# Patient Record
Sex: FEMALE | Race: WHITE | Employment: FULL TIME
[De-identification: names, ages, dates, MRNs, and addresses within clinical notes are randomized per-mention and may not be internally consistent; named-entity substitution may affect disease eponyms.]

---

## 2021-06-14 ENCOUNTER — NURSE TRIAGE (OUTPATIENT)
Dept: OTHER | Facility: CLINIC | Age: 51
End: 2021-06-14

## 2021-06-14 NOTE — TELEPHONE ENCOUNTER
any other symptoms? \" (e.g., fever, chest pain, vomiting, diarrhea, bleeding)        Bad heart burn more than normal over the past 2 days    11. PREGNANCY: \"Is there any chance you are pregnant? \" \"When was your last menstrual period? \"        No    Protocols used: [id number]

## 2021-06-30 ENCOUNTER — INITIAL CONSULT (OUTPATIENT)
Dept: PAIN MANAGEMENT | Age: 51
End: 2021-06-30
Payer: COMMERCIAL

## 2021-06-30 VITALS
BODY MASS INDEX: 35.33 KG/M2 | SYSTOLIC BLOOD PRESSURE: 122 MMHG | DIASTOLIC BLOOD PRESSURE: 90 MMHG | HEIGHT: 62 IN | WEIGHT: 192 LBS | TEMPERATURE: 97.2 F

## 2021-06-30 DIAGNOSIS — M51.36 DDD (DEGENERATIVE DISC DISEASE), LUMBAR: ICD-10-CM

## 2021-06-30 DIAGNOSIS — M47.817 LUMBOSACRAL SPONDYLOSIS WITHOUT MYELOPATHY: Primary | ICD-10-CM

## 2021-06-30 PROCEDURE — 99243 OFF/OP CNSLTJ NEW/EST LOW 30: CPT | Performed by: ANESTHESIOLOGY

## 2021-06-30 RX ORDER — TRAMADOL HYDROCHLORIDE 50 MG/1
50 TABLET ORAL 2 TIMES DAILY PRN
Qty: 60 TABLET | Refills: 0 | Status: SHIPPED | OUTPATIENT
Start: 2021-06-30 | End: 2021-07-30

## 2021-06-30 ASSESSMENT — ENCOUNTER SYMPTOMS
NAUSEA: 0
SHORTNESS OF BREATH: 0
CONSTIPATION: 0
BACK PAIN: 1
DIARRHEA: 0

## 2021-06-30 NOTE — PROGRESS NOTES
Patient:  Micky Madera  YOB: 1970  Date: 6/30/2021      Subjective:     Christina Bull is a 46 y.o. female who presents today with:    Chief Complaint   Patient presents with    Back Pain       HPI: The patient presents to the clinic for a consultation evaluation. The patient started having low back pain about 20 years ago while she was carrying a baby and during the postpartum she is in good improvement and this pain had been on and off with intermittent flareups up until recently when current flareup started about 4 weeks ago. She tried physical therapy in the past without benefit in March and she also has tried various kind of nonsteroidal anti-inflammatory agents and failed. Apparently she had some imaging studies but we do not have the reports. Allergies:  Nsaids, Clindamycin, and Codeine  No past medical history on file. No past surgical history on file. Social History     Socioeconomic History    Marital status:      Spouse name: Not on file    Number of children: Not on file    Years of education: Not on file    Highest education level: Not on file   Occupational History    Not on file   Tobacco Use    Smoking status: Current Every Day Smoker     Packs/day: 0.25     Years: 30.00     Pack years: 7.50     Types: Cigarettes    Smokeless tobacco: Never Used   Substance and Sexual Activity    Alcohol use: Yes     Comment: drinks socially    Drug use: Not on file    Sexual activity: Not on file   Other Topics Concern    Not on file   Social History Narrative    Not on file     Social Determinants of Health     Financial Resource Strain:     Difficulty of Paying Living Expenses:    Food Insecurity:     Worried About Running Out of Food in the Last Year:     Ran Out of Food in the Last Year:    Transportation Needs:     Lack of Transportation (Medical):      Lack of Transportation (Non-Medical):    Physical Activity:     Days of Exercise per Week:     Minutes of Exercise per Session:    Stress:     Feeling of Stress :    Social Connections:     Frequency of Communication with Friends and Family:     Frequency of Social Gatherings with Friends and Family:     Attends Baptism Services:     Active Member of Clubs or Organizations:     Attends Club or Organization Meetings:     Marital Status:    Intimate Partner Violence:     Fear of Current or Ex-Partner:     Emotionally Abused:     Physically Abused:     Sexually Abused:      No family history on file. No current outpatient medications on file prior to visit. No current facility-administered medications on file prior to visit. She previously tried physical therapy on or about In the past.        Recent Procedures:  N/A    Review of Systems   Constitutional: Negative for fever. HENT: Negative for hearing loss. Respiratory: Negative for shortness of breath. Gastrointestinal: Negative for constipation, diarrhea and nausea. Genitourinary: Negative for difficulty urinating. Musculoskeletal: Positive for back pain. Negative for neck pain. Skin: Negative for rash. Neurological: Negative for headaches. Hematological: Does not bruise/bleed easily. Psychiatric/Behavioral: Negative for sleep disturbance. Objective:     Vitals:  BP (!) 122/90 (Site: Left Upper Arm, Position: Sitting, Cuff Size: Large Adult)   Temp 97.2 °F (36.2 °C)   Ht 5' 2\" (1.575 m)   Wt 192 lb (87.1 kg)   BMI 35.12 kg/m² Pain Score:   7    PHYSICAL EXAM:    Patient alert and oriented times three, recent and remote memory intact, mood and affect normal, judgement and insight normal. Strength functional for ambulation. Balance and coordinational functional. Visualized skin intact. No visualized cyanosis, pulses intact. Cranial nerves 2-12 grossly intact. No obvious upper motor neuron signs seen. Sensation intact to light touch.     On physical examination heel and toe walking is adequate but the range of motion of the lumbosacral spine is limited to our perspective with a positive facet joint provocative maneuver. Straight leg raising is negative bilaterally. She has axial pain with the low back tenderness greater on the right side. Radiculopathy:  Negative    Studies Review:  Reviewed None. Assessment:           Diagnosis Orders   1. Lumbosacral spondylosis without myelopathy  XR LUMBAR SPINE (MIN 4 VIEWS)    OR INJ DX/THER AGNT PARAVERT FACET JOINT, LUMBAR/SAC, 1ST LEVEL    OR INJ DX/THER AGNT PARAVERT FACET JOINT, LUMBAR/SAC, 2ND LEVEL   2. DDD (degenerative disc disease), lumbar  XR LUMBAR SPINE (MIN 4 VIEWS)    OR INJ DX/THER AGNT PARAVERT FACET JOINT, LUMBAR/SAC, 1ST LEVEL    OR INJ DX/THER AGNT PARAVERT FACET JOINT, LUMBAR/SAC, 2ND LEVEL         Plan:     Orders Placed This Encounter   Procedures    XR LUMBAR SPINE (MIN 4 VIEWS)     Standing Status:   Future     Standing Expiration Date:   9/28/2021     Scheduling Instructions:      XR LUMBAR AP LAT FLEX EXT     Order Specific Question:   Reason for exam:     Answer:   pain    OR INJ DX/THER AGNT PARAVERT FACET JOINT, LUMBAR/SAC, 1ST LEVEL     Ghulam L3,4,5  MBB     Standing Status:   Future     Standing Expiration Date:   9/28/2021    OR INJ DX/THER AGNT PARAVERT FACET JOINT, LUMBAR/SAC, 2ND LEVEL     Ghulam L3,4,5  MBB     Standing Status:   Future     Standing Expiration Date:   9/28/2021       No orders of the defined types were placed in this encounter. Clinically we are dealing with the lower lumbar facet arthrosis. Discussed with the patient about facet denervation and MBB's as a test injections. The patient understands and wishes to proceed. She will be placed on tramadol 50 mg tablet twice a day. Follow up:  Return for 1-2 weeks MBB.     Sandip Suárez MD

## 2021-07-01 ENCOUNTER — TELEPHONE (OUTPATIENT)
Dept: PAIN MANAGEMENT | Age: 51
End: 2021-07-01

## 2021-07-01 NOTE — TELEPHONE ENCOUNTER
ORDER PLACED:    Date: 06/30/21  Description: Jaquan GOLDMAN  Order Number: [id number]  Ordering Provider: [facility]  Performing Provider: [facility]  CPT Codes: 43242  ICD10 Codes: K34.616

## 2021-09-02 NOTE — TELEPHONE ENCOUNTER
INS DENIED AUTH FOR JACKELIN MBB    REASON:  RECORDS SHOW A REQUEST FOR AN INJECTION INTOTHE SPINE THAT WILL INCLUDE STEROIDS. WHEN STEROIDS ARE USED, THIS PROCEDURE IS EXPERIMENTAL AND INVESTIGASTIONAL. THIS MEANS THAT IT IS NOT SUPPORTED BECAUSE RESEARCH HAS NOT PROVEN IT TO BE HELPFUL.       CHECKING TO APPEAL

## 2021-09-15 NOTE — TELEPHONE ENCOUNTER
EMILY GONZALEZ @ [phone] IN REGARDING MBB DENIAL DTD 7.6.21, LUISK TO TIM. I EXPLAINED WE REQUESTED AN MBB AND THE DENIAL IS FOR A FACET. SHE STATES THE DENIAL IS PAST TIMELY AND THE ONLY OPTION WE HAVE IS TO START A NEW [address]. EMAIL TO  TO START AUTH.         PASTOR#[id number] TIME: 2:00PM

## 2021-09-18 NOTE — TELEPHONE ENCOUNTER
CC- MBB AUTH- BILAT L3,4,5 X1- 9/18/2021 TO 12/31/2021     OK to schedule procedure approved as above. Please note sides/levels approved and date range.    (If applicable, sides/levels approved may differ from those ordered)    TO [facility]

## 2021-09-20 NOTE — TELEPHONE ENCOUNTER
Patient returned call stating that she does not want to schedule procedure at this time or anytime in the near future. ...

## 2023-01-12 NOTE — TELEPHONE ENCOUNTER
AUTHORIZATION: JACKELIN L3,4,5 MBB     INSURANCE: ALETHA JOSE [facility] VIA: Stephen Alston #: [id number]     DATE RANGE: 9/18/2021 TO 12/31/2021     TELEPHONE CALL ROUTED TO MA TO SCHEDULE. Detail Level: Detailed

## 2023-03-09 LAB — URINE CULTURE: NORMAL

## 2023-03-19 LAB — URINE CULTURE: NO GROWTH

## 2023-03-21 LAB
APPEARANCE, URINE: ABNORMAL
BACTERIA, URINE: ABNORMAL /HPF
BILIRUBIN, URINE: NEGATIVE
BLOOD, URINE: ABNORMAL
COLOR, URINE: YELLOW
GLUCOSE, URINE: NEGATIVE MG/DL
KETONES, URINE: NEGATIVE MG/DL
LEUKOCYTE ESTERASE, URINE: ABNORMAL
MUCUS, URINE: ABNORMAL /LPF
NITRITE, URINE: NEGATIVE
PH, URINE: 7 (ref 5–8)
PROTEIN, URINE: NEGATIVE MG/DL
RBC, URINE: 33 /HPF (ref 0–5)
SPECIFIC GRAVITY, URINE: 1.01 (ref 1–1.03)
SQUAMOUS EPITHELIAL CELLS, URINE: 5 /HPF
UROBILINOGEN, URINE: <2 MG/DL (ref 0–1.9)
WBC, URINE: 16 /HPF (ref 0–5)

## 2023-03-22 LAB — URINE CULTURE: NORMAL

## 2023-11-26 ENCOUNTER — APPOINTMENT (OUTPATIENT)
Dept: RADIOLOGY | Facility: HOSPITAL | Age: 53
End: 2023-11-26
Payer: COMMERCIAL

## 2023-11-26 ENCOUNTER — HOSPITAL ENCOUNTER (EMERGENCY)
Dept: CARDIOLOGY | Facility: HOSPITAL | Age: 53
Discharge: HOME | End: 2023-11-26
Payer: COMMERCIAL

## 2023-11-26 ENCOUNTER — HOSPITAL ENCOUNTER (EMERGENCY)
Facility: HOSPITAL | Age: 53
Discharge: HOME | End: 2023-11-26
Payer: COMMERCIAL

## 2023-11-26 VITALS
SYSTOLIC BLOOD PRESSURE: 150 MMHG | DIASTOLIC BLOOD PRESSURE: 88 MMHG | HEIGHT: 62 IN | BODY MASS INDEX: 30.36 KG/M2 | OXYGEN SATURATION: 96 % | TEMPERATURE: 98.2 F | WEIGHT: 165 LBS | HEART RATE: 67 BPM | RESPIRATION RATE: 18 BRPM

## 2023-11-26 DIAGNOSIS — J01.90 ACUTE SINUSITIS, RECURRENCE NOT SPECIFIED, UNSPECIFIED LOCATION: ICD-10-CM

## 2023-11-26 DIAGNOSIS — R03.0 ELEVATED BLOOD PRESSURE READING: Primary | ICD-10-CM

## 2023-11-26 LAB
ALBUMIN SERPL BCP-MCNC: 4.6 G/DL (ref 3.4–5)
ALP SERPL-CCNC: 40 U/L (ref 33–110)
ALT SERPL W P-5'-P-CCNC: 20 U/L (ref 7–45)
ANION GAP SERPL CALC-SCNC: 16 MMOL/L (ref 10–20)
AST SERPL W P-5'-P-CCNC: 29 U/L (ref 9–39)
BASOPHILS # BLD AUTO: 0.07 X10*3/UL (ref 0–0.1)
BASOPHILS NFR BLD AUTO: 1.1 %
BILIRUB SERPL-MCNC: 0.8 MG/DL (ref 0–1.2)
BNP SERPL-MCNC: 25 PG/ML (ref 0–99)
BUN SERPL-MCNC: 16 MG/DL (ref 6–23)
CALCIUM SERPL-MCNC: 9.3 MG/DL (ref 8.6–10.3)
CARDIAC TROPONIN I PNL SERPL HS: 3 NG/L (ref 0–13)
CHLORIDE SERPL-SCNC: 102 MMOL/L (ref 98–107)
CO2 SERPL-SCNC: 25 MMOL/L (ref 21–32)
CREAT SERPL-MCNC: 0.84 MG/DL (ref 0.5–1.05)
EOSINOPHIL # BLD AUTO: 0.22 X10*3/UL (ref 0–0.7)
EOSINOPHIL NFR BLD AUTO: 3.6 %
ERYTHROCYTE [DISTWIDTH] IN BLOOD BY AUTOMATED COUNT: 12.6 % (ref 11.5–14.5)
FLUAV RNA RESP QL NAA+PROBE: NOT DETECTED
FLUBV RNA RESP QL NAA+PROBE: NOT DETECTED
GFR SERPL CREATININE-BSD FRML MDRD: 82 ML/MIN/1.73M*2
GLUCOSE SERPL-MCNC: 93 MG/DL (ref 74–99)
HCT VFR BLD AUTO: 42.6 % (ref 36–46)
HGB BLD-MCNC: 14.6 G/DL (ref 12–16)
IMM GRANULOCYTES # BLD AUTO: 0.03 X10*3/UL (ref 0–0.7)
IMM GRANULOCYTES NFR BLD AUTO: 0.5 % (ref 0–0.9)
LYMPHOCYTES # BLD AUTO: 2 X10*3/UL (ref 1.2–4.8)
LYMPHOCYTES NFR BLD AUTO: 32.5 %
MAGNESIUM SERPL-MCNC: 2.12 MG/DL (ref 1.6–2.4)
MCH RBC QN AUTO: 32.8 PG (ref 26–34)
MCHC RBC AUTO-ENTMCNC: 34.3 G/DL (ref 32–36)
MCV RBC AUTO: 96 FL (ref 80–100)
MONOCYTES # BLD AUTO: 0.46 X10*3/UL (ref 0.1–1)
MONOCYTES NFR BLD AUTO: 7.5 %
NEUTROPHILS # BLD AUTO: 3.38 X10*3/UL (ref 1.2–7.7)
NEUTROPHILS NFR BLD AUTO: 54.8 %
NRBC BLD-RTO: 0 /100 WBCS (ref 0–0)
PLATELET # BLD AUTO: 269 X10*3/UL (ref 150–450)
POTASSIUM SERPL-SCNC: 5.1 MMOL/L (ref 3.5–5.3)
PROT SERPL-MCNC: 7.5 G/DL (ref 6.4–8.2)
RBC # BLD AUTO: 4.45 X10*6/UL (ref 4–5.2)
SARS-COV-2 RNA RESP QL NAA+PROBE: NOT DETECTED
SODIUM SERPL-SCNC: 138 MMOL/L (ref 136–145)
WBC # BLD AUTO: 6.2 X10*3/UL (ref 4.4–11.3)

## 2023-11-26 PROCEDURE — 71045 X-RAY EXAM CHEST 1 VIEW: CPT

## 2023-11-26 PROCEDURE — 93005 ELECTROCARDIOGRAM TRACING: CPT

## 2023-11-26 PROCEDURE — 87636 SARSCOV2 & INF A&B AMP PRB: CPT | Performed by: NURSE PRACTITIONER

## 2023-11-26 PROCEDURE — 80053 COMPREHEN METABOLIC PANEL: CPT | Performed by: NURSE PRACTITIONER

## 2023-11-26 PROCEDURE — 71045 X-RAY EXAM CHEST 1 VIEW: CPT | Performed by: RADIOLOGY

## 2023-11-26 PROCEDURE — 85025 COMPLETE CBC W/AUTO DIFF WBC: CPT | Performed by: NURSE PRACTITIONER

## 2023-11-26 PROCEDURE — 99284 EMERGENCY DEPT VISIT MOD MDM: CPT

## 2023-11-26 PROCEDURE — 83880 ASSAY OF NATRIURETIC PEPTIDE: CPT | Performed by: NURSE PRACTITIONER

## 2023-11-26 PROCEDURE — 83735 ASSAY OF MAGNESIUM: CPT | Performed by: NURSE PRACTITIONER

## 2023-11-26 PROCEDURE — 99283 EMERGENCY DEPT VISIT LOW MDM: CPT | Mod: 25

## 2023-11-26 PROCEDURE — 36415 COLL VENOUS BLD VENIPUNCTURE: CPT | Performed by: NURSE PRACTITIONER

## 2023-11-26 PROCEDURE — 84484 ASSAY OF TROPONIN QUANT: CPT | Performed by: NURSE PRACTITIONER

## 2023-11-26 RX ORDER — AMOXICILLIN AND CLAVULANATE POTASSIUM 875; 125 MG/1; MG/1
875 TABLET, FILM COATED ORAL 2 TIMES DAILY
Qty: 14 TABLET | Refills: 0 | Status: SHIPPED | OUTPATIENT
Start: 2023-11-26 | End: 2023-12-03

## 2023-11-26 ASSESSMENT — PAIN DESCRIPTION - LOCATION: LOCATION: THROAT

## 2023-11-26 ASSESSMENT — LIFESTYLE VARIABLES
EVER HAD A DRINK FIRST THING IN THE MORNING TO STEADY YOUR NERVES TO GET RID OF A HANGOVER: NO
REASON UNABLE TO ASSESS: NO
EVER FELT BAD OR GUILTY ABOUT YOUR DRINKING: NO
HAVE PEOPLE ANNOYED YOU BY CRITICIZING YOUR DRINKING: NO
HAVE YOU EVER FELT YOU SHOULD CUT DOWN ON YOUR DRINKING: NO

## 2023-11-26 ASSESSMENT — COLUMBIA-SUICIDE SEVERITY RATING SCALE - C-SSRS
2. HAVE YOU ACTUALLY HAD ANY THOUGHTS OF KILLING YOURSELF?: NO
1. IN THE PAST MONTH, HAVE YOU WISHED YOU WERE DEAD OR WISHED YOU COULD GO TO SLEEP AND NOT WAKE UP?: NO
6. HAVE YOU EVER DONE ANYTHING, STARTED TO DO ANYTHING, OR PREPARED TO DO ANYTHING TO END YOUR LIFE?: NO

## 2023-11-26 ASSESSMENT — PAIN - FUNCTIONAL ASSESSMENT: PAIN_FUNCTIONAL_ASSESSMENT: 0-10

## 2023-11-26 ASSESSMENT — PAIN DESCRIPTION - ORIENTATION: ORIENTATION: MID

## 2023-11-26 ASSESSMENT — PAIN SCALES - GENERAL: PAINLEVEL_OUTOF10: 3

## 2023-11-26 ASSESSMENT — PAIN DESCRIPTION - FREQUENCY: FREQUENCY: CONSTANT/CONTINUOUS

## 2023-11-26 ASSESSMENT — PAIN DESCRIPTION - DESCRIPTORS: DESCRIPTORS: ACHING;SORE

## 2023-11-26 NOTE — ED PROVIDER NOTES
HPI   Chief Complaint   Patient presents with    Flu Symptoms     Sore throat, chills, congestion, mucous since Tuesday,        53-year-old female presents emergency department, patient states since Tuesday she is been feeling unwell, describes facial pressure, congestion, cough, body aches and chills.  States this morning her temperature was elevated to 99.3 so she decided to go to the urgent care to get checked out.  At urgent care they notified her that her blood pressure was significantly elevated.  States she does have history of hypertension but usually related to her kidney stones, states all summer her blood pressure was normal at all of her doctors appointments.      History provided by:  Patient   used: No                        No data recorded                Patient History   Past Medical History:   Diagnosis Date    Acute vaginitis 08/18/2015    Bacterial vaginosis    Change in bowel habit 07/06/2018    Change in bowel habits    Contusion of unspecified back wall of thorax, initial encounter 04/20/2015    Contusion, back    Contusion of unspecified shoulder, initial encounter 04/20/2015    Shoulder contusion    Contusion of unspecified upper arm, initial encounter 04/20/2015    Contusion of arm    Disorder of teeth and supporting structures, unspecified     Chronic dental pain    Enterocolitis due to Clostridium difficile, not specified as recurrent     Clostridium difficile diarrhea    Essential (primary) hypertension 11/10/2015    Elevated BP    Personal history of other (healed) physical injury and trauma 04/20/2015    History of sprain of knee    Personal history of other diseases of the circulatory system     H/O: HTN (hypertension)    Personal history of other diseases of the respiratory system 09/25/2015    History of acute bronchitis    Personal history of other specified conditions 01/11/2019    History of diarrhea    Personal history of urinary calculi     History of renal  calculi    Pleurodynia 11/16/2015    Rib pain on right side    Unspecified fall, initial encounter 07/26/2016    Fall, initial encounter     Past Surgical History:   Procedure Laterality Date    APPENDECTOMY  04/15/2015    Appendectomy    LITHOTRIPSY  05/02/2016    Renal Lithotripsy    OTHER SURGICAL HISTORY  04/15/2015    Closed Treatment Of Acromioclavicular Dislocation    OTHER SURGICAL HISTORY  02/08/2022    Cystoscopy With Insertion Of Ureteral Stent    OTHER SURGICAL HISTORY  05/02/2016    Abdominal Paracentesis (Therapeutic)     No family history on file.  Social History     Tobacco Use    Smoking status: Not on file    Smokeless tobacco: Not on file   Substance Use Topics    Alcohol use: Not on file    Drug use: Not on file       Physical Exam   ED Triage Vitals [11/26/23 1236]   Temp Heart Rate Resp BP   36.8 °C (98.2 °F) 74 17 (!) 198/128      SpO2 Temp Source Heart Rate Source Patient Position   97 % Temporal Monitor Sitting      BP Location FiO2 (%)     Right arm --       Physical Exam  Physical Exam:  Constitutional: Vitals noted, no distress. Afebrile.   EENT: TMs clear. Posterior oropharynx unremarkable.   Cardiovascular: Regular, rate, rhythm, no murmur.   Pulmonary: Lungs with moderately diminished, some coarse breath sounds.  Gastrointestinal: Soft, nonsurgical. Nontender. No peritoneal signs. Normoactive bowel sounds.   Musculoskeletal: No peripheral edema. Negative Homans bilaterally, no cords.   Skin: No rash.       ED Course & MDM   Diagnoses as of 11/26/23 1437   Elevated blood pressure reading   Acute sinusitis, recurrence not specified, unspecified location     Labs Reviewed   COMPREHENSIVE METABOLIC PANEL - Normal       Result Value    Glucose 93      Sodium 138      Potassium 5.1      Chloride 102      Bicarbonate 25      Anion Gap 16      Urea Nitrogen 16      Creatinine 0.84      eGFR 82      Calcium 9.3      Albumin 4.6      Alkaline Phosphatase 40      Total Protein 7.5      AST 29       Bilirubin, Total 0.8      ALT 20     MAGNESIUM - Normal    Magnesium 2.12     TROPONIN I, HIGH SENSITIVITY - Normal    Troponin I, High Sensitivity 3      Narrative:     Less than 99th percentile of normal range cutoff-  Female and children under 18 years old <14 ng/L; Male <21 ng/L: Negative  Repeat testing should be performed if clinically indicated.     Female and children under 18 years old 14-50 ng/L; Male 21-50 ng/L:  Consistent with possible cardiac damage and possible increased clinical   risk. Serial measurements may help to assess extent of myocardial damage.     >50 ng/L: Consistent with cardiac damage, increased clinical risk and  myocardial infarction. Serial measurements may help assess extent of   myocardial damage.      NOTE: Children less than 1 year old may have higher baseline troponin   levels and results should be interpreted in conjunction with the overall   clinical context.     NOTE: Troponin I testing is performed using a different   testing methodology at [facility] than at other   [facility]. Direct result comparisons should only   be made within the same method.   B-TYPE NATRIURETIC PEPTIDE - Normal    BNP 25      Narrative:        <100 pg/mL - Heart failure unlikely  100-299 pg/mL - Intermediate probability of acute heart                  failure exacerbation. Correlate with clinical                  context and patient history.    >=300 pg/mL - Heart Failure likely. Correlate with clinical                  context and patient history.    BNP testing is performed using different testing methodology at [facility] than at other [facility]. Direct result comparisons should only be made within the same method.      SARS-COV-2 AND INFLUENZA A/B PCR - Normal    Flu A Result Not Detected      Flu B Result Not Detected      Coronavirus 2019, PCR Not Detected      Narrative:     This assay has received FDA Emergency Use Authorization (EUA) and  is only  authorized for the duration of time that circumstances exist to justify the authorization of the emergency use of in vitro diagnostic tests for the detection of SARS-CoV-2 virus and/or diagnosis of COVID-19 infection under section 564(b)(1) of the Act, 21 U.S.C. 360bbb-3(b)(1). Testing for SARS-CoV-2 is only recommended for patients who meet current clinical and/or epidemiological criteria as defined by federal, state, or local public health directives. This assay is an in vitro diagnostic nucleic acid amplification test for the qualitative detection of SARS-CoV-2, Influenza A, and Influenza B from nasopharyngeal specimens and has been validated for use at [facility]. Negative results do not preclude COVID-19 infections or Influenza A/B infections, and should not be used as the sole basis for diagnosis, treatment, or other management decisions. If Influenza A/B and RSV PCR results are negative, testing for Parainfluenza virus, Adenovirus and Metapneumovirus is routinely performed for [facility] pediatric oncology and intensive care inpatients, and is available on other patients by placing an add-on request.    CBC WITH AUTO DIFFERENTIAL    WBC 6.2      nRBC 0.0      RBC 4.45      Hemoglobin 14.6      Hematocrit 42.6      MCV 96      MCH 32.8      MCHC 34.3      RDW 12.6      Platelets 269      Neutrophils % 54.8      Immature Granulocytes %, Automated 0.5      Lymphocytes % 32.5      Monocytes % 7.5      Eosinophils % 3.6      Basophils % 1.1      Neutrophils Absolute 3.38      Immature Granulocytes Absolute, Automated 0.03      Lymphocytes Absolute 2.00      Monocytes Absolute 0.46      Eosinophils Absolute 0.22      Basophils Absolute 0.07          XR chest 1 view   Final Result   NO ACUTE DISEASE IN THE CHEST        MACRO:   None        Signed by: Maury Sumner 11/26/2023 2:25 PM   Dictation workstation:   [id number]         EKG at 1314 with ventricular rate of 70, as interpreted me, shows normal  sinus rhythm with normal axis normal interval.  Unremarkable ST-T wave pattern, no evidence of acute ischemia or other acute finding.    Medical Decision Making  Laboratory work-up was obtained, patient CBC and metabolic panels were unremarkable, negative troponin, BNP.  Chest x-ray unremarkable as well.  Negative for COVID-19 and influenza.    Patient's blood pressure remained elevated, although did improve after rest in the department.    Ultimately she will be treated for acute sinusitis given that she has had congestion and productive cough for about 1 week without improvement.    Discussed closely monitoring her blood pressure at home with the machine, and recommended close follow-up with primary care when she is feeling a bit better.  Should return with any worsening symptoms or any additional concerns.    Procedure  Procedures     Karolina Ballard, NIDHI-GUZMAN  11/26/23 3513

## 2023-11-26 NOTE — DISCHARGE INSTRUCTIONS
As discussed, recommend you keep an eye on your blood pressure, follow-up closely with your primary care next week when you are feeling better to reevaluate.    Return to the emergency department any worsening symptoms or any additional concerns.

## 2023-11-26 NOTE — Clinical Note
Danni Arriaga was seen and treated in our emergency department on 11/26/2023.  She may return to work on 11/29/2023.       If you have any questions or concerns, please don't hesitate to call.      Karolina Ballard, APRN-CNP

## 2023-11-30 ENCOUNTER — HOSPITAL ENCOUNTER (EMERGENCY)
Facility: HOSPITAL | Age: 53
Discharge: HOME | End: 2023-11-30
Payer: COMMERCIAL

## 2023-11-30 VITALS
DIASTOLIC BLOOD PRESSURE: 105 MMHG | SYSTOLIC BLOOD PRESSURE: 165 MMHG | TEMPERATURE: 98.1 F | OXYGEN SATURATION: 95 % | RESPIRATION RATE: 16 BRPM | BODY MASS INDEX: 30.43 KG/M2 | HEIGHT: 62 IN | WEIGHT: 165.34 LBS | HEART RATE: 73 BPM

## 2023-11-30 DIAGNOSIS — R05.2 SUBACUTE COUGH: Primary | ICD-10-CM

## 2023-11-30 PROCEDURE — 99283 EMERGENCY DEPT VISIT LOW MDM: CPT

## 2023-11-30 PROCEDURE — 2500000004 HC RX 250 GENERAL PHARMACY W/ HCPCS (ALT 636 FOR OP/ED): Performed by: PHYSICIAN ASSISTANT

## 2023-11-30 RX ORDER — ALBUTEROL SULFATE 90 UG/1
1-2 AEROSOL, METERED RESPIRATORY (INHALATION) EVERY 6 HOURS PRN
Qty: 18 G | Refills: 0 | Status: SHIPPED | OUTPATIENT
Start: 2023-11-30 | End: 2023-12-30

## 2023-11-30 RX ADMIN — DEXAMETHASONE 10 MG: 6 TABLET ORAL at 11:38

## 2023-11-30 ASSESSMENT — PAIN - FUNCTIONAL ASSESSMENT: PAIN_FUNCTIONAL_ASSESSMENT: 0-10

## 2023-11-30 ASSESSMENT — LIFESTYLE VARIABLES
HAVE YOU EVER FELT YOU SHOULD CUT DOWN ON YOUR DRINKING: NO
REASON UNABLE TO ASSESS: NO
EVER HAD A DRINK FIRST THING IN THE MORNING TO STEADY YOUR NERVES TO GET RID OF A HANGOVER: NO
EVER FELT BAD OR GUILTY ABOUT YOUR DRINKING: NO
HAVE PEOPLE ANNOYED YOU BY CRITICIZING YOUR DRINKING: NO

## 2023-11-30 ASSESSMENT — COLUMBIA-SUICIDE SEVERITY RATING SCALE - C-SSRS
1. IN THE PAST MONTH, HAVE YOU WISHED YOU WERE DEAD OR WISHED YOU COULD GO TO SLEEP AND NOT WAKE UP?: NO
6. HAVE YOU EVER DONE ANYTHING, STARTED TO DO ANYTHING, OR PREPARED TO DO ANYTHING TO END YOUR LIFE?: NO
2. HAVE YOU ACTUALLY HAD ANY THOUGHTS OF KILLING YOURSELF?: NO

## 2023-11-30 ASSESSMENT — PAIN SCALES - GENERAL: PAINLEVEL_OUTOF10: 2

## 2023-11-30 NOTE — ED PROVIDER NOTES
"HPI   Chief Complaint   Patient presents with    Cough     \"I was hereon Sunday and was pur on antibiotics and they are not working and need a work note and am still sick.\"       HPI     53-year-old female presents for a continued cough and states that she is only here to receive another work note.  She was supposed to go back yesterday, but not go back and is not feeling any better.  She was placed on Augmentin and is still taking this.  Her symptoms have been ongoing for little over a week.  Denies fever, sore throat, ear pain, SOB or CP    ROS negative unless otherwise stated in HPI                   Lissa Coma Scale Score: 15                  Patient History   Past Medical History:   Diagnosis Date    Acute vaginitis 08/18/2015    Bacterial vaginosis    Change in bowel habit 07/06/2018    Change in bowel habits    Contusion of unspecified back wall of thorax, initial encounter 04/20/2015    Contusion, back    Contusion of unspecified shoulder, initial encounter 04/20/2015    Shoulder contusion    Contusion of unspecified upper arm, initial encounter 04/20/2015    Contusion of arm    Disorder of teeth and supporting structures, unspecified     Chronic dental pain    Enterocolitis due to Clostridium difficile, not specified as recurrent     Clostridium difficile diarrhea    Essential (primary) hypertension 11/10/2015    Elevated BP    Personal history of other (healed) physical injury and trauma 04/20/2015    History of sprain of knee    Personal history of other diseases of the circulatory system     H/O: HTN (hypertension)    Personal history of other diseases of the respiratory system 09/25/2015    History of acute bronchitis    Personal history of other specified conditions 01/11/2019    History of diarrhea    Personal history of urinary calculi     History of renal calculi    Pleurodynia 11/16/2015    Rib pain on right side    Unspecified fall, initial encounter 07/26/2016    Fall, initial encounter "     Past Surgical History:   Procedure Laterality Date    APPENDECTOMY  04/15/2015    Appendectomy    LITHOTRIPSY  05/02/2016    Renal Lithotripsy    OTHER SURGICAL HISTORY  04/15/2015    Closed Treatment Of Acromioclavicular Dislocation    OTHER SURGICAL HISTORY  02/08/2022    Cystoscopy With Insertion Of Ureteral Stent    OTHER SURGICAL HISTORY  05/02/2016    Abdominal Paracentesis (Therapeutic)     No family history on file.  Social History     Tobacco Use    Smoking status: Every Day     Types: Cigarettes    Smokeless tobacco: Never   Vaping Use    Vaping Use: Never used   Substance Use Topics    Alcohol use: Yes    Drug use: Never       Physical Exam   ED Triage Vitals [11/30/23 1115]   Temp Heart Rate Resp BP   36.7 °C (98.1 °F) 73 16 (!) 165/105      SpO2 Temp src Heart Rate Source Patient Position   95 % -- Monitor Sitting      BP Location FiO2 (%)     Right arm --       Physical Exam    General: alert, no distress, well nourished, talking in full and complete sentences  Skin: dry, intact, no rashes  Head: atraumatic  Eyes: EOMI, PERRLA, normal conjunctiva  Throat: no stridor, no hot potato voice  Nose: nares patent  Mouth: mucous membranes moist  CV: +S1/S1  Respiratory: non labored breathing, lungs CTA, no wheezing, no retractions  Extremities: FROM X4, strength +5/5, pulses intact, capillary refill intact, radial pulses equal  Neuro: A&Ox3, no focal neurological deficits  Psych: cooperative, appropriate mood      ED Course & MDM   Diagnoses as of 11/30/23 1130   Subacute cough       Medical Decision Making  Previous ER record reviewed.  Chest x-ray was negative at that time and she was treated for sinusitis with Augmentin, but still not feeling well and has a hoarse voice and will be given a steroid and albuterol inhaler and follow-up with family doctor.  Afebrile, not tachycardic, not tachypneic, not hypoxic, tolerating PO, non toxic appearing and ambulating at baseline at discharge. Hemodynamically  intact. All questions answered. Educated on SE of meds. Patient in agreement with treatment.      Procedure  Procedures     Drea Lake PA-C  11/30/23 1639

## 2023-11-30 NOTE — Clinical Note
Danni Arriaga was seen and treated in our emergency department on 11/30/2023.  She may return to work on 12/03/2023.       If you have any questions or concerns, please don't hesitate to call.      rDea Lake PA-C

## 2023-12-05 ENCOUNTER — APPOINTMENT (OUTPATIENT)
Dept: PRIMARY CARE | Facility: CLINIC | Age: 53
End: 2023-12-05
Payer: COMMERCIAL

## 2023-12-11 ENCOUNTER — OFFICE VISIT (OUTPATIENT)
Dept: UROLOGY | Facility: CLINIC | Age: 53
End: 2023-12-11
Payer: COMMERCIAL

## 2023-12-11 VITALS
TEMPERATURE: 96.1 F | HEART RATE: 73 BPM | SYSTOLIC BLOOD PRESSURE: 169 MMHG | BODY MASS INDEX: 32.01 KG/M2 | DIASTOLIC BLOOD PRESSURE: 100 MMHG | WEIGHT: 175 LBS

## 2023-12-11 DIAGNOSIS — R82.991 HYPOCITRATURIA: ICD-10-CM

## 2023-12-11 DIAGNOSIS — N20.0 NEPHROLITHIASIS: Primary | ICD-10-CM

## 2023-12-11 DIAGNOSIS — R82.994 HYPERCALCIURIA: ICD-10-CM

## 2023-12-11 LAB
POC APPEARANCE, URINE: CLEAR
POC BILIRUBIN, URINE: NEGATIVE
POC BLOOD, URINE: NEGATIVE
POC COLOR, URINE: YELLOW
POC GLUCOSE, URINE: NEGATIVE MG/DL
POC KETONES, URINE: NEGATIVE MG/DL
POC LEUKOCYTES, URINE: NEGATIVE
POC NITRITE,URINE: NEGATIVE
POC PH, URINE: 6 PH
POC PROTEIN, URINE: NEGATIVE MG/DL
POC SPECIFIC GRAVITY, URINE: 1.02
POC UROBILINOGEN, URINE: 0.2 EU/DL

## 2023-12-11 PROCEDURE — 81003 URINALYSIS AUTO W/O SCOPE: CPT | Performed by: UROLOGY

## 2023-12-11 PROCEDURE — 99214 OFFICE O/P EST MOD 30 MIN: CPT | Performed by: UROLOGY

## 2023-12-11 RX ORDER — METHYLPREDNISOLONE 4 MG/1
TABLET ORAL
Qty: 21 TABLET | Refills: 0 | Status: CANCELLED | OUTPATIENT
Start: 2023-12-11 | End: 2023-12-18

## 2023-12-11 RX ORDER — CHLORTHALIDONE 25 MG/1
12.5 TABLET ORAL DAILY
Qty: 30 TABLET | Refills: 4 | Status: SHIPPED | OUTPATIENT
Start: 2023-12-11 | End: 2023-12-11 | Stop reason: SDUPTHER

## 2023-12-11 RX ORDER — CHLORTHALIDONE 25 MG/1
12.5 TABLET ORAL DAILY
Qty: 45 TABLET | Refills: 3 | Status: SHIPPED | OUTPATIENT
Start: 2023-12-11 | End: 2024-12-10

## 2023-12-11 NOTE — PROGRESS NOTES
PRIOR NOTES  53-year-old female referred to me for nephrolithiasis  Referral from ED  PMH: Hypertension, nephrolithiasis  Patient was in the ED on 1/31 and 2/8 with pain from nephrolithiasis. Right sided flank pain, htn. Pain improved initially. Ultimately returned - R flank radiating to R abdomen.  Prior nephrolithiasis w/ ESWL x 3, last one several years ago  Constant, severe pain. 5/10, spikes to an 8/10.   CT A/P 2/8/2023 with IV contrast: 2 nonobstructing stones on the left lower pole, anteriorly located largest of which is 6mm. Smaller right-sided nonobstructing stone midpole 4 mm.  Mean Hounsfield units 796 (measured on the left) skin to stone near 13 cm. No calculi within the bladder or ureter tubes bilaterally.  UCx negative      OR 3/14/23 - b/l URS and LL. On the L she had a calyceal tic that I lasered into w/ 8mm stone behind it. Left 4.7fr stents  Srone - CaOx Mono 90%, di 10%     FUV 3/17/23 - here for stent removal  UA with nitrite and leuk esterase (though she has 2 stents in place and is on Pyridium so difficult to interpret). 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  She is working very hard to increase her fluid intake and also add citrus7    Past Medical History  She has a past medical history of Acute vaginitis (08/18/2015), Change in bowel habit (07/06/2018), Contusion of unspecified back wall of thorax, initial encounter (04/20/2015), Contusion of unspecified shoulder, initial encounter (04/20/2015), Contusion of unspecified upper arm, initial encounter (04/20/2015), Disorder of  teeth and supporting structures, unspecified, Enterocolitis due to Clostridium difficile, not specified as recurrent, Essential (primary) hypertension (11/10/2015), Personal history of other (healed) physical injury and trauma (04/20/2015), Personal history of other diseases of the circulatory system, Personal history of other diseases of the respiratory system (09/25/2015), Personal history of other specified conditions (01/11/2019), Personal history of urinary calculi, Pleurodynia (11/16/2015), and Unspecified fall, initial encounter (07/26/2016).    Surgical History  She has a past surgical history that includes Other surgical history (04/15/2015); Appendectomy (04/15/2015); Other surgical history (02/08/2022); Other surgical history (05/02/2016); and Lithotripsy (05/02/2016).     Social History  She reports that she has been smoking cigarettes. She has never used smokeless tobacco. She reports current alcohol use. She reports that she does not use drugs.    Family History  No family history on file.     Allergies  Patient has no known allergies.    ROS: 12 system review was completed and is negative with the exception of those signs and symptoms noted in the history of present illness: A 12 system review was completed and is negative with the exception of those signs and symptoms noted in the history of present illness.     Exam:  General: in NAD, appears stated age  Head: normocephalic, atraumatic  Respiratory: normal effort, no use of accessory muscles  Cardiovascular: no edema noted  Skin: normal turgor, no rashes  Neurologic: grossly intact, oriented to person/place/time  Psychiatric: mode and affect appropriate     Last Recorded Vitals  Blood pressure (!) 169/100, pulse 73, temperature 35.6 °C (96.1 °F), weight 79.4 kg (175 lb).    Lab Results   Component Value Date    CREATININE 0.84 11/26/2023    HGB 14.6 11/26/2023         ASSESSMENT/PLAN:  # Recurrent nephrolithiasis, hypercalciuria,  hypocitraturia  -Patient also has hypertension  -I am very impressed by the amount of fluid she is not taking, I think if she keeps this up along she will go a long way to preventing most of her kidney stones  -Given that she has high blood pressure and hypercalciuria, I recommended starting chlorthalidone  -Start at 12.5 mg daily, discussed side effects  -Repeat Litholink and ultrasound in 6 months    Nixon Gannon MD

## 2023-12-11 NOTE — LETTER
December 11, 2023     Patient: Danni Arriaga   YOB: 1970   Date of Visit: 12/11/2023       To Whom It May Concern:    Danni Arriaga was seen in my clinic on 12/11/2023 at 8:30 am. Please excuse Danni for her absence from work on this day to make the appointment.    If you have any questions or concerns, please don't hesitate to call.         Sincerely,         Nixon Gannon MD        CC: No Recipients

## 2024-06-14 ENCOUNTER — APPOINTMENT (OUTPATIENT)
Dept: UROLOGY | Facility: CLINIC | Age: 54
End: 2024-06-14
Payer: COMMERCIAL

## 2025-01-16 ENCOUNTER — HOSPITAL ENCOUNTER (EMERGENCY)
Facility: HOSPITAL | Age: 55
Discharge: HOME | End: 2025-01-16
Payer: COMMERCIAL

## 2025-01-16 ENCOUNTER — APPOINTMENT (OUTPATIENT)
Dept: RADIOLOGY | Facility: HOSPITAL | Age: 55
End: 2025-01-16
Payer: COMMERCIAL

## 2025-01-16 VITALS
BODY MASS INDEX: 32.2 KG/M2 | DIASTOLIC BLOOD PRESSURE: 82 MMHG | WEIGHT: 175 LBS | HEART RATE: 87 BPM | OXYGEN SATURATION: 98 % | RESPIRATION RATE: 18 BRPM | SYSTOLIC BLOOD PRESSURE: 151 MMHG | HEIGHT: 62 IN | TEMPERATURE: 97.7 F

## 2025-01-16 DIAGNOSIS — R10.9 FLANK PAIN: Primary | ICD-10-CM

## 2025-01-16 LAB
ALBUMIN SERPL BCP-MCNC: 4.3 G/DL (ref 3.4–5)
ALP SERPL-CCNC: 41 U/L (ref 33–110)
ALT SERPL W P-5'-P-CCNC: 14 U/L (ref 7–45)
ANION GAP SERPL CALC-SCNC: 12 MMOL/L (ref 10–20)
APPEARANCE UR: CLEAR
AST SERPL W P-5'-P-CCNC: 14 U/L (ref 9–39)
BASOPHILS # BLD AUTO: 0.07 X10*3/UL (ref 0–0.1)
BASOPHILS NFR BLD AUTO: 1.2 %
BILIRUB DIRECT SERPL-MCNC: 0.1 MG/DL (ref 0–0.3)
BILIRUB SERPL-MCNC: 0.4 MG/DL (ref 0–1.2)
BILIRUB UR STRIP.AUTO-MCNC: NEGATIVE MG/DL
BUN SERPL-MCNC: 18 MG/DL (ref 6–23)
CALCIUM SERPL-MCNC: 8.8 MG/DL (ref 8.6–10.3)
CHLORIDE SERPL-SCNC: 108 MMOL/L (ref 98–107)
CO2 SERPL-SCNC: 22 MMOL/L (ref 21–32)
COLOR UR: NORMAL
CREAT SERPL-MCNC: 0.85 MG/DL (ref 0.5–1.05)
EGFRCR SERPLBLD CKD-EPI 2021: 81 ML/MIN/1.73M*2
EOSINOPHIL # BLD AUTO: 0.22 X10*3/UL (ref 0–0.7)
EOSINOPHIL NFR BLD AUTO: 3.9 %
ERYTHROCYTE [DISTWIDTH] IN BLOOD BY AUTOMATED COUNT: 12.8 % (ref 11.5–14.5)
GLUCOSE SERPL-MCNC: 114 MG/DL (ref 74–99)
GLUCOSE UR STRIP.AUTO-MCNC: NORMAL MG/DL
HCT VFR BLD AUTO: 40.3 % (ref 36–46)
HGB BLD-MCNC: 13.9 G/DL (ref 12–16)
IMM GRANULOCYTES # BLD AUTO: 0.01 X10*3/UL (ref 0–0.7)
IMM GRANULOCYTES NFR BLD AUTO: 0.2 % (ref 0–0.9)
KETONES UR STRIP.AUTO-MCNC: NEGATIVE MG/DL
LACTATE SERPL-SCNC: 1 MMOL/L (ref 0.4–2)
LEUKOCYTE ESTERASE UR QL STRIP.AUTO: NEGATIVE
LYMPHOCYTES # BLD AUTO: 1.59 X10*3/UL (ref 1.2–4.8)
LYMPHOCYTES NFR BLD AUTO: 28.2 %
MCH RBC QN AUTO: 32 PG (ref 26–34)
MCHC RBC AUTO-ENTMCNC: 34.5 G/DL (ref 32–36)
MCV RBC AUTO: 93 FL (ref 80–100)
MONOCYTES # BLD AUTO: 0.37 X10*3/UL (ref 0.1–1)
MONOCYTES NFR BLD AUTO: 6.6 %
NEUTROPHILS # BLD AUTO: 3.38 X10*3/UL (ref 1.2–7.7)
NEUTROPHILS NFR BLD AUTO: 59.9 %
NITRITE UR QL STRIP.AUTO: NEGATIVE
NRBC BLD-RTO: 0 /100 WBCS (ref 0–0)
PH UR STRIP.AUTO: 6 [PH]
PLATELET # BLD AUTO: 256 X10*3/UL (ref 150–450)
POTASSIUM SERPL-SCNC: 4.1 MMOL/L (ref 3.5–5.3)
PROT SERPL-MCNC: 6.7 G/DL (ref 6.4–8.2)
PROT UR STRIP.AUTO-MCNC: NEGATIVE MG/DL
RBC # BLD AUTO: 4.35 X10*6/UL (ref 4–5.2)
RBC # UR STRIP.AUTO: NEGATIVE /UL
SODIUM SERPL-SCNC: 138 MMOL/L (ref 136–145)
SP GR UR STRIP.AUTO: 1.02
UROBILINOGEN UR STRIP.AUTO-MCNC: NORMAL MG/DL
WBC # BLD AUTO: 5.6 X10*3/UL (ref 4.4–11.3)

## 2025-01-16 PROCEDURE — 74176 CT ABD & PELVIS W/O CONTRAST: CPT | Mod: FOREIGN READ | Performed by: RADIOLOGY

## 2025-01-16 PROCEDURE — 84075 ASSAY ALKALINE PHOSPHATASE: CPT | Performed by: NURSE PRACTITIONER

## 2025-01-16 PROCEDURE — 96374 THER/PROPH/DIAG INJ IV PUSH: CPT

## 2025-01-16 PROCEDURE — 74176 CT ABD & PELVIS W/O CONTRAST: CPT

## 2025-01-16 PROCEDURE — 96375 TX/PRO/DX INJ NEW DRUG ADDON: CPT

## 2025-01-16 PROCEDURE — 80048 BASIC METABOLIC PNL TOTAL CA: CPT | Performed by: NURSE PRACTITIONER

## 2025-01-16 PROCEDURE — 36415 COLL VENOUS BLD VENIPUNCTURE: CPT | Performed by: NURSE PRACTITIONER

## 2025-01-16 PROCEDURE — 99284 EMERGENCY DEPT VISIT MOD MDM: CPT | Mod: 25

## 2025-01-16 PROCEDURE — 81003 URINALYSIS AUTO W/O SCOPE: CPT | Performed by: NURSE PRACTITIONER

## 2025-01-16 PROCEDURE — 2500000004 HC RX 250 GENERAL PHARMACY W/ HCPCS (ALT 636 FOR OP/ED): Performed by: NURSE PRACTITIONER

## 2025-01-16 PROCEDURE — 85025 COMPLETE CBC W/AUTO DIFF WBC: CPT | Performed by: NURSE PRACTITIONER

## 2025-01-16 PROCEDURE — 83605 ASSAY OF LACTIC ACID: CPT | Performed by: NURSE PRACTITIONER

## 2025-01-16 PROCEDURE — 80053 COMPREHEN METABOLIC PANEL: CPT | Performed by: NURSE PRACTITIONER

## 2025-01-16 PROCEDURE — 82040 ASSAY OF SERUM ALBUMIN: CPT | Performed by: NURSE PRACTITIONER

## 2025-01-16 RX ORDER — ONDANSETRON HYDROCHLORIDE 2 MG/ML
4 INJECTION, SOLUTION INTRAVENOUS ONCE
Status: COMPLETED | OUTPATIENT
Start: 2025-01-16 | End: 2025-01-16

## 2025-01-16 RX ORDER — KETOROLAC TROMETHAMINE 30 MG/ML
15 INJECTION, SOLUTION INTRAMUSCULAR; INTRAVENOUS ONCE
Status: COMPLETED | OUTPATIENT
Start: 2025-01-16 | End: 2025-01-16

## 2025-01-16 RX ORDER — HYDROCODONE BITARTRATE AND ACETAMINOPHEN 5; 325 MG/1; MG/1
1 TABLET ORAL EVERY 6 HOURS PRN
Qty: 6 TABLET | Refills: 0 | Status: SHIPPED | OUTPATIENT
Start: 2025-01-16

## 2025-01-16 RX ADMIN — ONDANSETRON 4 MG: 2 INJECTION INTRAMUSCULAR; INTRAVENOUS at 13:36

## 2025-01-16 RX ADMIN — KETOROLAC TROMETHAMINE 15 MG: 30 INJECTION, SOLUTION INTRAMUSCULAR at 13:35

## 2025-01-16 ASSESSMENT — PAIN - FUNCTIONAL ASSESSMENT: PAIN_FUNCTIONAL_ASSESSMENT: 0-10

## 2025-01-16 ASSESSMENT — PAIN DESCRIPTION - LOCATION: LOCATION: BACK

## 2025-01-16 ASSESSMENT — LIFESTYLE VARIABLES
HAVE YOU EVER FELT YOU SHOULD CUT DOWN ON YOUR DRINKING: NO
EVER FELT BAD OR GUILTY ABOUT YOUR DRINKING: NO
TOTAL SCORE: 0
EVER HAD A DRINK FIRST THING IN THE MORNING TO STEADY YOUR NERVES TO GET RID OF A HANGOVER: NO
HAVE PEOPLE ANNOYED YOU BY CRITICIZING YOUR DRINKING: NO

## 2025-01-16 ASSESSMENT — PAIN DESCRIPTION - ORIENTATION: ORIENTATION: RIGHT

## 2025-01-16 ASSESSMENT — PAIN DESCRIPTION - PROGRESSION: CLINICAL_PROGRESSION: NOT CHANGED

## 2025-01-16 ASSESSMENT — PAIN SCALES - GENERAL
PAINLEVEL_OUTOF10: 7
PAINLEVEL_OUTOF10: 4

## 2025-01-16 ASSESSMENT — PAIN DESCRIPTION - DESCRIPTORS: DESCRIPTORS: SHARP;SHOOTING;STABBING

## 2025-01-16 ASSESSMENT — PAIN DESCRIPTION - FREQUENCY: FREQUENCY: CONSTANT/CONTINUOUS

## 2025-01-16 ASSESSMENT — PAIN DESCRIPTION - PAIN TYPE: TYPE: ACUTE PAIN

## 2025-01-16 ASSESSMENT — PAIN DESCRIPTION - ONSET: ONSET: SUDDEN

## 2025-01-16 NOTE — Clinical Note
Danni Arriaga was seen and treated in our emergency department on 1/16/2025.  She may return to work on 01/18/2025.       If you have any questions or concerns, please don't hesitate to call.      Karolina Ballard, APRN-CNP

## 2025-01-16 NOTE — ED PROVIDER NOTES
HPI   Chief Complaint   Patient presents with    Flank Pain     Right flank pain       54-year-old female presents emergency department, states he been having right flank pain for about the last 2 weeks, folic she passed a stone yesterday as pain resolved and she felt something pass that she urinated.  Patient states this morning woke up with significant right flank pain, feeling nauseated.  States that she has had some chills last couple days, and has had some dysuria as well.  She does have history of kidney stones.  States he did try to call her urologist but they were unable to see her until March.      History provided by:  Patient   used: No            Patient History   Past Medical History:   Diagnosis Date    Acute vaginitis 08/18/2015    Bacterial vaginosis    Change in bowel habit 07/06/2018    Change in bowel habits    Contusion of unspecified back wall of thorax, initial encounter 04/20/2015    Contusion, back    Contusion of unspecified shoulder, initial encounter 04/20/2015    Shoulder contusion    Contusion of unspecified upper arm, initial encounter 04/20/2015    Contusion of arm    Disorder of teeth and supporting structures, unspecified     Chronic dental pain    Enterocolitis due to Clostridium difficile, not specified as recurrent     Clostridium difficile diarrhea    Essential (primary) hypertension 11/10/2015    Elevated BP    Personal history of other (healed) physical injury and trauma 04/20/2015    History of sprain of knee    Personal history of other diseases of the circulatory system     H/O: HTN (hypertension)    Personal history of other diseases of the respiratory system 09/25/2015    History of acute bronchitis    Personal history of other specified conditions 01/11/2019    History of diarrhea    Personal history of urinary calculi     History of renal calculi    Pleurodynia 11/16/2015    Rib pain on right side    Unspecified fall, initial encounter 07/26/2016     Fall, initial encounter     Past Surgical History:   Procedure Laterality Date    APPENDECTOMY  04/15/2015    Appendectomy    LITHOTRIPSY  05/02/2016    Renal Lithotripsy    OTHER SURGICAL HISTORY  04/15/2015    Closed Treatment Of Acromioclavicular Dislocation    OTHER SURGICAL HISTORY  02/08/2022    Cystoscopy With Insertion Of Ureteral Stent    OTHER SURGICAL HISTORY  05/02/2016    Abdominal Paracentesis (Therapeutic)     No family history on file.  Social History     Tobacco Use    Smoking status: Every Day     Types: Cigarettes    Smokeless tobacco: Never   Vaping Use    Vaping status: Never Used   Substance Use Topics    Alcohol use: Yes    Drug use: Never       Physical Exam   ED Triage Vitals [01/16/25 1212]   Temperature Heart Rate Respirations BP   36.6 °C (97.9 °F) 84 20 (!) 186/99      Pulse Ox Temp Source Heart Rate Source Patient Position   96 % Temporal Monitor Sitting      BP Location FiO2 (%)     Right arm --       Physical Exam  Constitutional: Vitals noted, no distress. Afebrile.   Cardiovascular: Regular, rate, rhythm, no murmur.   Pulmonary: Lungs clear bilaterally with good aeration. No adventitious breath sounds.   Gastrointestinal: Soft, nonsurgical. Nontender. No peritoneal signs. Normoactive bowel sounds.   Musculoskeletal: No peripheral edema. Negative Homans bilaterally, no cords.   Skin: No rash.   Neuro: No focal neurologic deficits, NIH score of 0.      ED Course & MDM   Diagnoses as of 01/16/25 1436   Flank pain     Labs Reviewed   BASIC METABOLIC PANEL - Abnormal       Result Value    Glucose 114 (*)     Sodium 138      Potassium 4.1      Chloride 108 (*)     Bicarbonate 22      Anion Gap 12      Urea Nitrogen 18      Creatinine 0.85      eGFR 81      Calcium 8.8     HEPATIC FUNCTION PANEL - Normal    Albumin 4.3      Bilirubin, Total 0.4      Bilirubin, Direct 0.1      Alkaline Phosphatase 41      ALT 14      AST 14      Total Protein 6.7     LACTATE - Normal    Lactate 1.0       Narrative:     Venipuncture immediately after or during the administration of Metamizole may lead to falsely low results. Testing should be performed immediately prior to Metamizole dosing.   URINALYSIS WITH REFLEX CULTURE AND MICROSCOPIC - Normal    Color, Urine Light-Yellow      Appearance, Urine Clear      Specific Gravity, Urine 1.021      pH, Urine 6.0      Protein, Urine NEGATIVE      Glucose, Urine Normal      Blood, Urine NEGATIVE      Ketones, Urine NEGATIVE      Bilirubin, Urine NEGATIVE      Urobilinogen, Urine Normal      Nitrite, Urine NEGATIVE      Leukocyte Esterase, Urine NEGATIVE     CBC WITH AUTO DIFFERENTIAL    WBC 5.6      nRBC 0.0      RBC 4.35      Hemoglobin 13.9      Hematocrit 40.3      MCV 93      MCH 32.0      MCHC 34.5      RDW 12.8      Platelets 256      Neutrophils % 59.9      Immature Granulocytes %, Automated 0.2      Lymphocytes % 28.2      Monocytes % 6.6      Eosinophils % 3.9      Basophils % 1.2      Neutrophils Absolute 3.38      Immature Granulocytes Absolute, Automated 0.01      Lymphocytes Absolute 1.59      Monocytes Absolute 0.37      Eosinophils Absolute 0.22      Basophils Absolute 0.07     URINALYSIS WITH REFLEX CULTURE AND MICROSCOPIC    Narrative:     The following orders were created for panel order Urinalysis with Reflex Culture and Microscopic.  Procedure                               Abnormality         Status                     ---------                               -----------         ------                     Urinalysis with Reflex C...[[id number]]  Normal              Final result               Extra Urine Gray Tube[[id number]]                            In process                   Please view results for these tests on the individual orders.   EXTRA URINE GRAY TUBE        CT abdomen pelvis wo IV contrast   Final Result   Bilateral nonobstructing renal calculi.  There is no evidence ureteral   calculi.  There is no evidence of hydronephrosis.   Otherwise no  acute process.   Signed by Josh Sanchez MD                  No data recorded     Lissa Coma Scale Score: 15 (01/16/25 1211 : Boy Sawyer RN)                   Medical Decision Making    Patient was medicated for flank pain, given IV Toradol and IV Zofran.    CBC and metabolic panels unremarkable, normal lactate level, urinalysis without any evidence of infection or microscopic hematuria.    CT imaging of the abdomen/pelvis showed bilateral nonobstructing renal calculi, no evidence of ureteral calculi.    Discussed results with the patient, pain is resolved.  Discussed medications for any reoccurrence of pain, discharged home on a short course of hydrocodone, otherwise if pain is persistent she should return for reevaluation, otherwise follow-up with primary care and/or urology  Procedure  Procedures     Karolina Ballard, NIDHI-CNP  01/16/25 3146

## 2025-01-17 ENCOUNTER — TELEPHONE (OUTPATIENT)
Dept: UROLOGY | Facility: CLINIC | Age: 55
End: 2025-01-17
Payer: COMMERCIAL

## 2025-01-17 LAB — HOLD SPECIMEN: NORMAL

## 2025-01-17 NOTE — TELEPHONE ENCOUNTER
Patient called stating that she was in the ER yesterday and has 5 kidney stones and was discharged she is still having pain and does not want to go back to the ER

## 2025-01-27 ENCOUNTER — APPOINTMENT (OUTPATIENT)
Dept: UROLOGY | Facility: CLINIC | Age: 55
End: 2025-01-27
Payer: COMMERCIAL

## 2025-01-27 ENCOUNTER — HOSPITAL ENCOUNTER (OUTPATIENT)
Facility: HOSPITAL | Age: 55
Setting detail: OUTPATIENT SURGERY
End: 2025-01-27
Attending: UROLOGY | Admitting: UROLOGY
Payer: COMMERCIAL

## 2025-01-27 VITALS — DIASTOLIC BLOOD PRESSURE: 85 MMHG | SYSTOLIC BLOOD PRESSURE: 136 MMHG | HEART RATE: 71 BPM

## 2025-01-27 DIAGNOSIS — N20.0 NEPHROLITHIASIS: Primary | ICD-10-CM

## 2025-01-27 LAB
POC APPEARANCE, URINE: CLEAR
POC BILIRUBIN, URINE: NEGATIVE
POC BLOOD, URINE: NEGATIVE
POC COLOR, URINE: YELLOW
POC GLUCOSE, URINE: NEGATIVE MG/DL
POC KETONES, URINE: NEGATIVE MG/DL
POC LEUKOCYTES, URINE: NEGATIVE
POC NITRITE,URINE: NEGATIVE
POC PH, URINE: 5.5 PH
POC PROTEIN, URINE: NEGATIVE MG/DL
POC SPECIFIC GRAVITY, URINE: <=1.005
POC UROBILINOGEN, URINE: 0.2 EU/DL

## 2025-01-27 PROCEDURE — 81003 URINALYSIS AUTO W/O SCOPE: CPT | Performed by: UROLOGY

## 2025-01-27 PROCEDURE — 99214 OFFICE O/P EST MOD 30 MIN: CPT | Performed by: UROLOGY

## 2025-01-27 PROCEDURE — G2211 COMPLEX E/M VISIT ADD ON: HCPCS | Performed by: UROLOGY

## 2025-01-27 RX ORDER — ACETAMINOPHEN 325 MG/1
975 TABLET ORAL ONCE
OUTPATIENT
Start: 2025-01-27 | End: 2025-01-27

## 2025-01-27 RX ORDER — TAMSULOSIN HYDROCHLORIDE 0.4 MG/1
0.4 CAPSULE ORAL DAILY
Qty: 30 CAPSULE | Refills: 2 | Status: SHIPPED | OUTPATIENT
Start: 2025-01-27 | End: 2025-04-27

## 2025-01-27 RX ORDER — DICLOFENAC POTASSIUM 50 MG/1
50 TABLET, FILM COATED ORAL 3 TIMES DAILY PRN
Qty: 90 TABLET | Refills: 0 | Status: SHIPPED | OUTPATIENT
Start: 2025-01-27 | End: 2026-01-27

## 2025-01-27 RX ORDER — CEFAZOLIN SODIUM 2 G/100ML
2 INJECTION, SOLUTION INTRAVENOUS ONCE
OUTPATIENT
Start: 2025-01-27 | End: 2025-01-27

## 2025-01-27 RX ORDER — CYCLOBENZAPRINE HCL 5 MG
5 TABLET ORAL 3 TIMES DAILY PRN
Qty: 30 TABLET | Refills: 0 | Status: SHIPPED | OUTPATIENT
Start: 2025-01-27 | End: 2025-02-06

## 2025-01-27 NOTE — LETTER
January 27, 2025     Patient: Danni Arriaga   YOB: 1970   Date of Visit: 1/27/2025       To Whom It May Concern:    Danni Arriaga was seen in my clinic on 1/27/2025 at 10:00 am. Please excuse Danni for her absence from work on this day to make the appointment.    If you have any questions or concerns, please don't hesitate to call.         Sincerely,         Nixon Gannon MD        CC: No Recipients

## 2025-01-27 NOTE — PROGRESS NOTES
PRIOR NOTES  54-year-old female referred to me for nephrolithiasis  Referral from ED  PMH: Hypertension, nephrolithiasis  Patient was in the ED on 1/31 and 2/8 with pain from nephrolithiasis. Right sided flank pain, htn. Pain improved initially. Ultimately returned - R flank radiating to R abdomen.  Prior nephrolithiasis w/ ESWL x 3, last one several years ago  Constant, severe pain. 5/10, spikes to an 8/10.   CT A/P 2/8/2023 with IV contrast: 2 nonobstructing stones on the left lower pole, anteriorly located largest of which is 6mm. Smaller right-sided nonobstructing stone midpole 4 mm.  Mean Hounsfield units 796 (measured on the left) skin to stone near 13 cm. No calculi within the bladder or ureter tubes bilaterally.  UCx negative      OR 3/14/23 - b/l URS and LL. On the L she had a calyceal tic that I lasered into w/ 8mm stone behind it. Left 4.7fr stents  Srone - CaOx Mono 90%, di 10%     FUV 3/17/23 - here for stent removal  UA with nitrite and leuk esterase (though she has 2 stents in place and is on Pyridium so difficult to interpret). 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 Sore now, not impacting qol.     Past Medical History  She has a past medical history of Acute vaginitis (08/18/2015), Change in bowel habit (07/06/2018), Contusion of unspecified back wall of thorax, initial encounter (04/20/2015), Contusion of unspecified shoulder, initial encounter (04/20/2015), Contusion of unspecified  upper arm, initial encounter (04/20/2015), Disorder of teeth and supporting structures, unspecified, Enterocolitis due to Clostridium difficile, not specified as recurrent, Essential (primary) hypertension (11/10/2015), Personal history of other (healed) physical injury and trauma (04/20/2015), Personal history of other diseases of the circulatory system, Personal history of other diseases of the respiratory system (09/25/2015), Personal history of other specified conditions (01/11/2019), Personal history of urinary calculi, Pleurodynia (11/16/2015), and Unspecified fall, initial encounter (07/26/2016).    Surgical History  She has a past surgical history that includes Other surgical history (04/15/2015); Appendectomy (04/15/2015); Other surgical history (02/08/2022); Other surgical history (05/02/2016); and Lithotripsy (05/02/2016).     Social History  She reports that she has been smoking cigarettes. She has never used smokeless tobacco. She reports current alcohol use. She reports that she does not use drugs.    Family History  No family history on file.     Allergies  Patient has no known allergies.    ROS: 12 system review was completed and is negative with the exception of those signs and symptoms noted in the history of present illness: A 12 system review was completed and is negative with the exception of those signs and symptoms noted in the history of present illness.     Exam:  General: in NAD, appears stated age  Head: normocephalic, atraumatic  Respiratory: normal effort, no use of accessory muscles  Cardiovascular: no edema noted  Skin: normal turgor, no rashes  Neurologic: grossly intact, oriented to person/place/time  Psychiatric: mode and affect appropriate     Last Recorded Vitals  Blood pressure 136/85, pulse 71.    Lab Results   Component Value Date    CREATININE 0.85 01/16/2025    HGB 13.9 01/16/2025         ASSESSMENT/PLAN:  Right-sided flank pain  -I showed her CT scan, reviewed that her  stones are 1 to 2 mm in size, nonobstructing  -I explained there is a high probability that these are not the source of her pain, granted she has had kidney stones for a long time she is very familiar with renal colic and this feels consistent with her prior stone episodes  -I offered ureteroscopy, with the understanding that stone is very small and therefore we may not be able to see it intraoperatively or after treating the stone, her pain may persist.  I made this very clear and she expressed explicit understanding  -Explained risks of surgery including stent discomfort, urinary tract infection, ureteral trauma    Nixon Gannon MD

## 2025-03-03 ENCOUNTER — APPOINTMENT (OUTPATIENT)
Dept: UROLOGY | Facility: CLINIC | Age: 55
End: 2025-03-03
Payer: COMMERCIAL

## 2025-03-31 ENCOUNTER — APPOINTMENT (OUTPATIENT)
Dept: UROLOGY | Facility: CLINIC | Age: 55
End: 2025-03-31
Payer: COMMERCIAL

## 2025-07-08 ENCOUNTER — PATIENT OUTREACH (OUTPATIENT)
Dept: CARE COORDINATION | Facility: CLINIC | Age: 55
End: 2025-07-08
Payer: COMMERCIAL

## 2025-07-08 DIAGNOSIS — Z12.31 ENCOUNTER FOR SCREENING MAMMOGRAM FOR BREAST CANCER: ICD-10-CM
